# Patient Record
Sex: MALE | Race: WHITE | NOT HISPANIC OR LATINO | URBAN - METROPOLITAN AREA
[De-identification: names, ages, dates, MRNs, and addresses within clinical notes are randomized per-mention and may not be internally consistent; named-entity substitution may affect disease eponyms.]

---

## 2022-02-11 ENCOUNTER — WEB ENCOUNTER (OUTPATIENT)
Dept: URBAN - METROPOLITAN AREA CLINIC 107 | Facility: CLINIC | Age: 72
End: 2022-02-11

## 2022-02-11 ENCOUNTER — OFFICE VISIT (OUTPATIENT)
Dept: URBAN - METROPOLITAN AREA CLINIC 107 | Facility: CLINIC | Age: 72
End: 2022-02-11
Payer: MEDICARE

## 2022-02-11 VITALS
BODY MASS INDEX: 35.14 KG/M2 | DIASTOLIC BLOOD PRESSURE: 79 MMHG | WEIGHT: 251 LBS | HEIGHT: 71 IN | TEMPERATURE: 97.1 F | HEART RATE: 56 BPM | SYSTOLIC BLOOD PRESSURE: 163 MMHG | RESPIRATION RATE: 18 BRPM

## 2022-02-11 DIAGNOSIS — R10.11 RUQ ABDOMINAL PAIN: ICD-10-CM

## 2022-02-11 DIAGNOSIS — Z12.11 COLON CANCER SCREENING: ICD-10-CM

## 2022-02-11 DIAGNOSIS — R63.4 WEIGHT LOSS: ICD-10-CM

## 2022-02-11 PROCEDURE — 99244 OFF/OP CNSLTJ NEW/EST MOD 40: CPT | Performed by: INTERNAL MEDICINE

## 2022-02-11 PROCEDURE — 99204 OFFICE O/P NEW MOD 45 MIN: CPT | Performed by: INTERNAL MEDICINE

## 2022-02-11 RX ORDER — ATENOLOL 50 MG/1
1 TABLET TABLET ORAL ONCE A DAY
Status: ACTIVE | COMMUNITY

## 2022-02-11 RX ORDER — PRAVASTATIN SODIUM 40 MG/1
1 TABLET TABLET ORAL ONCE A DAY
Status: ACTIVE | COMMUNITY

## 2022-02-11 RX ORDER — LEVOTHYROXINE SODIUM 175 UG/1
1 TABLET IN THE MORNING ON AN EMPTY STOMACH TABLET ORAL ONCE A DAY
Status: ACTIVE | COMMUNITY

## 2022-02-11 RX ORDER — LISINOPRIL 10 MG/1
1 TABLET TABLET ORAL ONCE A DAY
Status: ACTIVE | COMMUNITY

## 2022-02-11 RX ORDER — SODIUM, POTASSIUM,MAG SULFATES 17.5-3.13G
354 ML SOLUTION, RECONSTITUTED, ORAL ORAL ONCE
Qty: 354 ML | Refills: 0 | OUTPATIENT
Start: 2022-02-11 | End: 2022-02-12

## 2022-02-11 NOTE — HPI-TODAY'S VISIT:
The patient was referred by Dr. Alfred Newton for colonoscopy consult.   A copy of this document is being forwarded to the referring provider.  71-year-old male presenting with a primary complaint of stomach issues and colon cancer screening. He has never had a colonoscopy before and wishes to have one for colon cancer screening.   He is also had about a 60 pound weight loss over the past year.He has intermittent right upper quadrant abdominal pain.  He has not had any imaging or endoscopy for this.

## 2022-02-21 PROBLEM — 305058001: Status: ACTIVE | Noted: 2022-02-21

## 2022-03-14 ENCOUNTER — WEB ENCOUNTER (OUTPATIENT)
Dept: URBAN - METROPOLITAN AREA SURGERY CENTER 25 | Facility: SURGERY CENTER | Age: 72
End: 2022-03-14

## 2022-03-14 ENCOUNTER — CLAIMS CREATED FROM THE CLAIM WINDOW (OUTPATIENT)
Dept: URBAN - METROPOLITAN AREA CLINIC 4 | Facility: CLINIC | Age: 72
End: 2022-03-14
Payer: MEDICARE

## 2022-03-14 ENCOUNTER — OFFICE VISIT (OUTPATIENT)
Dept: URBAN - METROPOLITAN AREA SURGERY CENTER 25 | Facility: SURGERY CENTER | Age: 72
End: 2022-03-14
Payer: MEDICARE

## 2022-03-14 DIAGNOSIS — D12.3 ADENOMA OF TRANSVERSE COLON: ICD-10-CM

## 2022-03-14 DIAGNOSIS — D12.3 BENIGN NEOPLASM OF TRANSVERSE COLON: ICD-10-CM

## 2022-03-14 DIAGNOSIS — D12.5 ADENOMA OF SIGMOID COLON: ICD-10-CM

## 2022-03-14 DIAGNOSIS — D12.5 BENIGN NEOPLASM OF SIGMOID COLON: ICD-10-CM

## 2022-03-14 DIAGNOSIS — Z12.11 COLON CANCER SCREENING: ICD-10-CM

## 2022-03-14 PROCEDURE — 88305 TISSUE EXAM BY PATHOLOGIST: CPT | Performed by: PATHOLOGY

## 2022-03-14 PROCEDURE — 45385 COLONOSCOPY W/LESION REMOVAL: CPT | Performed by: INTERNAL MEDICINE

## 2022-03-14 PROCEDURE — G8907 PT DOC NO EVENTS ON DISCHARG: HCPCS | Performed by: INTERNAL MEDICINE

## 2022-03-14 RX ORDER — LEVOTHYROXINE SODIUM 175 UG/1
1 TABLET IN THE MORNING ON AN EMPTY STOMACH TABLET ORAL ONCE A DAY
Status: ACTIVE | COMMUNITY

## 2022-03-14 RX ORDER — LISINOPRIL 10 MG/1
1 TABLET TABLET ORAL ONCE A DAY
Status: ACTIVE | COMMUNITY

## 2022-03-14 RX ORDER — ATENOLOL 50 MG/1
1 TABLET TABLET ORAL ONCE A DAY
Status: ACTIVE | COMMUNITY

## 2022-03-14 RX ORDER — PRAVASTATIN SODIUM 40 MG/1
1 TABLET TABLET ORAL ONCE A DAY
Status: ACTIVE | COMMUNITY

## 2022-03-16 ENCOUNTER — CLAIMS CREATED FROM THE CLAIM WINDOW (OUTPATIENT)
Dept: URBAN - METROPOLITAN AREA CLINIC 4 | Facility: CLINIC | Age: 72
End: 2022-03-16
Payer: MEDICARE

## 2022-03-16 ENCOUNTER — OFFICE VISIT (OUTPATIENT)
Dept: URBAN - METROPOLITAN AREA SURGERY CENTER 25 | Facility: SURGERY CENTER | Age: 72
End: 2022-03-16
Payer: MEDICARE

## 2022-03-16 DIAGNOSIS — K31.89 REACTIVE GASTROPATHY: ICD-10-CM

## 2022-03-16 DIAGNOSIS — R11.0 NAUSEA: ICD-10-CM

## 2022-03-16 DIAGNOSIS — R63.4 ABNORMAL WEIGHT LOSS: ICD-10-CM

## 2022-03-16 DIAGNOSIS — K29.70 GASTRITIS, UNSPECIFIED, WITHOUT BLEEDING: ICD-10-CM

## 2022-03-16 PROCEDURE — G8907 PT DOC NO EVENTS ON DISCHARG: HCPCS | Performed by: INTERNAL MEDICINE

## 2022-03-16 PROCEDURE — 43239 EGD BIOPSY SINGLE/MULTIPLE: CPT | Performed by: INTERNAL MEDICINE

## 2022-03-16 PROCEDURE — 88312 SPECIAL STAINS GROUP 1: CPT | Performed by: PATHOLOGY

## 2022-03-16 PROCEDURE — 88305 TISSUE EXAM BY PATHOLOGIST: CPT | Performed by: PATHOLOGY

## 2022-03-16 RX ORDER — LEVOTHYROXINE SODIUM 175 UG/1
1 TABLET IN THE MORNING ON AN EMPTY STOMACH TABLET ORAL ONCE A DAY
Status: ACTIVE | COMMUNITY

## 2022-03-16 RX ORDER — ATENOLOL 50 MG/1
1 TABLET TABLET ORAL ONCE A DAY
Status: ACTIVE | COMMUNITY

## 2022-03-16 RX ORDER — PRAVASTATIN SODIUM 40 MG/1
1 TABLET TABLET ORAL ONCE A DAY
Status: ACTIVE | COMMUNITY

## 2022-03-16 RX ORDER — LISINOPRIL 10 MG/1
1 TABLET TABLET ORAL ONCE A DAY
Status: ACTIVE | COMMUNITY

## 2022-03-16 RX ORDER — OMEPRAZOLE 20 MG/1
1 CAPSULE 30 MINUTES BEFORE MORNING MEAL CAPSULE, DELAYED RELEASE ORAL ONCE A DAY
Qty: 90 | Refills: 1 | OUTPATIENT
Start: 2022-03-16

## 2022-04-01 ENCOUNTER — OFFICE VISIT (OUTPATIENT)
Dept: URBAN - METROPOLITAN AREA CLINIC 107 | Facility: CLINIC | Age: 72
End: 2022-04-01

## 2022-04-08 ENCOUNTER — OFFICE VISIT (OUTPATIENT)
Dept: URBAN - METROPOLITAN AREA CLINIC 107 | Facility: CLINIC | Age: 72
End: 2022-04-08

## 2022-04-22 ENCOUNTER — OFFICE VISIT (OUTPATIENT)
Dept: URBAN - METROPOLITAN AREA CLINIC 107 | Facility: CLINIC | Age: 72
End: 2022-04-22
Payer: MEDICARE

## 2022-04-22 VITALS
WEIGHT: 246 LBS | HEART RATE: 55 BPM | DIASTOLIC BLOOD PRESSURE: 79 MMHG | TEMPERATURE: 97.5 F | HEIGHT: 71 IN | SYSTOLIC BLOOD PRESSURE: 160 MMHG | RESPIRATION RATE: 18 BRPM | BODY MASS INDEX: 34.44 KG/M2

## 2022-04-22 DIAGNOSIS — D12.6 TUBULAR ADENOMA OF COLON: ICD-10-CM

## 2022-04-22 DIAGNOSIS — R10.9 RIGHT SIDED ABDOMINAL PAIN: ICD-10-CM

## 2022-04-22 DIAGNOSIS — R63.4 UNINTENTIONAL WEIGHT LOSS: ICD-10-CM

## 2022-04-22 PROCEDURE — 99213 OFFICE O/P EST LOW 20 MIN: CPT

## 2022-04-22 RX ORDER — ATENOLOL 50 MG/1
1 TABLET TABLET ORAL ONCE A DAY
Status: ACTIVE | COMMUNITY

## 2022-04-22 RX ORDER — LISINOPRIL 10 MG/1
1 TABLET TABLET ORAL ONCE A DAY
Status: ACTIVE | COMMUNITY

## 2022-04-22 RX ORDER — PRAVASTATIN SODIUM 40 MG/1
1 TABLET TABLET ORAL ONCE A DAY
Status: ACTIVE | COMMUNITY

## 2022-04-22 RX ORDER — LEVOTHYROXINE SODIUM 175 UG/1
1 TABLET IN THE MORNING ON AN EMPTY STOMACH TABLET ORAL ONCE A DAY
Status: ACTIVE | COMMUNITY

## 2022-04-22 NOTE — HPI-TODAY'S VISIT:
71-year-old male presents for follow-up after colonoscopy and EGD.  He was last seen on 2/11/2022 as a referral for colon cancer screening.  He had never had a colonoscopy.  He also reported a 60 pound weight loss over the past year along with intermittent right upper quadrant pain.  He had not had any imaging or endoscopic evaluation for the symptoms.  He was planned for a screening colonoscopy as well as an EGD and a CT scan of the abdomen/pelvis to further evaluate weight loss and abdominal pain. Colonoscopy 3/14/2022:Performed without difficulty.  BB PS score of 9 using Suprep.  Removal of 2 6 to 8 mm polyps in the transverse colon.  Removal of one 12 mm polyp in the sigmoid colon.  Nonbleeding internal hemorrhoids.  Distal rectum and anal verge were normal.  Pathology revealed all to be tubular adenomas.  A repeat colonoscopy was recommended in 3 years for surveillance.  EGD 3/16/2022:Performed without difficulty.  Esophagus and duodenum were normal.  Notable for gastritis which was biopsied.  Pathology revealed chemical/reactive gastropathy without evidence of H. pylori, intestinal metaplasia, dysplasia or malignancy.  Overall, benign inflammation.  CT scan of the abdomen/pelvis with contrast 2/15/2022:No acute process.  Liver, gallbladder, pancreas, spleen, and GI tract are normal.  Multilevel degenerative changes of the spine with mild chronic central compression at the L3 vertebral body.   Bowel movements have softened following colonoscopy. He has a formed bowel movement daily.  No blood per rectum or melena. He does have occasional right sided abdominal pain ongoing for several years. The pain feels "as if someone is poking their finger into his side."  He broke his back in 2010 due to a deck collapse. In 2019, he had a cervical fusion. He admits to chronic back pain ever since. He does admit his appetite decreased after having Covid last year. He was very fatigued and lost his taste and smell. He gets a "smoked biscuit smell" in his nose every morning. He and his wife took Ivermectin daily, but they are not sure if it helped. He states his appetite never returned to normal, and he becomes full quickly. He often skips breakfast and consumes one meal around 3-4 pm. He sees [facility] PCP every 6 months for routine blood work. His best friends and wife's brother passed away from gastric cancer.

## 2022-06-07 ENCOUNTER — TELEPHONE ENCOUNTER (OUTPATIENT)
Dept: URBAN - METROPOLITAN AREA CLINIC 113 | Facility: CLINIC | Age: 72
End: 2022-06-07

## 2022-06-14 ENCOUNTER — TELEPHONE ENCOUNTER (OUTPATIENT)
Dept: URBAN - METROPOLITAN AREA CLINIC 113 | Facility: CLINIC | Age: 72
End: 2022-06-14

## 2022-06-30 ENCOUNTER — DASHBOARD ENCOUNTERS (OUTPATIENT)
Age: 72
End: 2022-06-30

## 2022-06-30 ENCOUNTER — OFFICE VISIT (OUTPATIENT)
Dept: URBAN - METROPOLITAN AREA CLINIC 107 | Facility: CLINIC | Age: 72
End: 2022-06-30
Payer: MEDICARE

## 2022-06-30 VITALS
DIASTOLIC BLOOD PRESSURE: 73 MMHG | SYSTOLIC BLOOD PRESSURE: 148 MMHG | HEIGHT: 71 IN | BODY MASS INDEX: 34.58 KG/M2 | TEMPERATURE: 97.4 F | HEART RATE: 65 BPM | WEIGHT: 247 LBS

## 2022-06-30 DIAGNOSIS — R63.4 UNINTENTIONAL WEIGHT LOSS: ICD-10-CM

## 2022-06-30 DIAGNOSIS — R10.9 RIGHT SIDED ABDOMINAL PAIN: ICD-10-CM

## 2022-06-30 DIAGNOSIS — D12.6 TUBULAR ADENOMA OF COLON: ICD-10-CM

## 2022-06-30 PROCEDURE — 99213 OFFICE O/P EST LOW 20 MIN: CPT

## 2022-06-30 RX ORDER — LEVOTHYROXINE SODIUM 175 UG/1
1 TABLET IN THE MORNING ON AN EMPTY STOMACH TABLET ORAL ONCE A DAY
Status: ACTIVE | COMMUNITY

## 2022-06-30 RX ORDER — PRAVASTATIN SODIUM 40 MG/1
1 TABLET TABLET ORAL ONCE A DAY
Status: ACTIVE | COMMUNITY

## 2022-06-30 RX ORDER — ATENOLOL 50 MG/1
1 TABLET TABLET ORAL ONCE A DAY
Status: ACTIVE | COMMUNITY

## 2022-06-30 RX ORDER — LISINOPRIL 10 MG/1
1 TABLET TABLET ORAL ONCE A DAY
Status: ACTIVE | COMMUNITY

## 2022-06-30 NOTE — HPI-TODAY'S VISIT:
72-year-old male presents for follow-up.  He was last seen on 4/22/2022.  He reported unintentional weight loss of 60 pounds over the last year.  EGD and CT scan have been unremarkable.  He was planned for labs to evaluate for thyroid disease, metabolic imbalance, HIV, hepatitis and other inflammatory processes. Expected labs will be unremarkable.  Suspect his weight loss is due to decreased appetite and oral intake over the past year following COVID infection as his appetite never returned to normal.  He was recommended to increase to 2 meals per day and consume protein when able.  He did also report chronic intermittent right-sided abdominal pain ongoing for years and not very bothersome.  Suspect musculoskeletal and possibly nerve induced pain radiating from prior lumbar back injury.  Again, CT scan was unremarkable.  He is due for colon polyp surveillance in 2025.  Labs 5/6/2022:Normal CBC, normal CMP with mildly elevated AST of 37, normal TSH and T4, normal CRP, nonreactive HIV screen, negative acute hepatitis serologies, normal sed rate.   He is consuming 2 meals most days if he can. He does like to sleep in "these days." He has regular bowel movements without blood per rectum. Abdominal pain has resolved. He denies nausea or vomiting. He did get a letter from an endocrinologist. He thinks his PCP referred him for unknown reasons.   4/22/22: 71-year-old male presents for follow-up after colonoscopy and EGD.  He was last seen on 2/11/2022 as a referral for colon cancer screening.  He had never had a colonoscopy.  He also reported a 60 pound weight loss over the past year along with intermittent right upper quadrant pain.  He had not had any imaging or endoscopic evaluation for the symptoms.  He was planned for a screening colonoscopy as well as an EGD and a CT scan of the abdomen/pelvis to further evaluate weight loss and abdominal pain.  Bowel movements have softened following colonoscopy. He has a formed bowel movement daily.  No blood per rectum or melena. He does have occasional right sided abdominal pain ongoing for several years. The pain feels "as if someone is poking their finger into his side."  He broke his back in 2010 due to a deck collapse. In 2019, he had a cervical fusion. He admits to chronic back pain ever since. He does admit his appetite decreased after having Covid last year. He was very fatigued and lost his taste and smell. He gets a "smoked biscuit smell" in his nose every morning. He and his wife took Ivermectin daily, but they are not sure if it helped. He states his appetite never returned to normal, and he becomes full quickly. He often skips breakfast and consumes one meal around 3-4 pm. He sees his PCP every 6 months for routine blood work. His best friends and wife's brother passed away from gastric cancer.

## 2022-06-30 NOTE — HPI-OTHER HISTORIES
Colonoscopy 3/14/2022:Performed without difficulty.  BB PS score of 9 using Suprep.  Removal of 2 6 to 8 mm polyps in the transverse colon.  Removal of one 12 mm polyp in the sigmoid colon.  Nonbleeding internal hemorrhoids.  Distal rectum and anal verge were normal.  Pathology revealed all to be tubular adenomas.  A repeat colonoscopy was recommended in 3 years for surveillance.  EGD 3/16/2022:Performed without difficulty.  Esophagus and duodenum were normal.  Notable for gastritis which was biopsied.  Pathology revealed chemical/reactive gastropathy without evidence of H. pylori, intestinal metaplasia, dysplasia or malignancy.  Overall, benign inflammation.  CT scan of the abdomen/pelvis with contrast 2/15/2022:No acute process.  Liver, gallbladder, pancreas, spleen, and GI tract are normal.  Multilevel degenerative changes of the spine with mild chronic central compression at the L3 vertebral body.
sensory intact

## 2025-03-14 ENCOUNTER — TELEPHONE ENCOUNTER (OUTPATIENT)
Dept: URBAN - METROPOLITAN AREA CLINIC 113 | Facility: CLINIC | Age: 75
End: 2025-03-14